# Patient Record
Sex: FEMALE | Race: WHITE | URBAN - METROPOLITAN AREA
[De-identification: names, ages, dates, MRNs, and addresses within clinical notes are randomized per-mention and may not be internally consistent; named-entity substitution may affect disease eponyms.]

---

## 2019-02-21 ENCOUNTER — APPOINTMENT (OUTPATIENT)
Dept: URGENT CARE | Age: 59
End: 2019-02-21

## 2019-02-21 PROCEDURE — 87086 URINE CULTURE/COLONY COUNT: CPT | Performed by: EMERGENCY MEDICINE

## 2024-05-21 ENCOUNTER — APPOINTMENT (RX ONLY)
Dept: URBAN - METROPOLITAN AREA CLINIC 147 | Facility: CLINIC | Age: 64
Setting detail: DERMATOLOGY
End: 2024-05-21

## 2024-05-21 DIAGNOSIS — Z41.9 ENCOUNTER FOR PROCEDURE FOR PURPOSES OTHER THAN REMEDYING HEALTH STATE, UNSPECIFIED: ICD-10-CM

## 2024-05-21 PROCEDURE — ? PLASMA PEN

## 2024-05-21 ASSESSMENT — LOCATION ZONE DERM: LOCATION ZONE: FACE

## 2024-05-21 ASSESSMENT — LOCATION DETAILED DESCRIPTION DERM: LOCATION DETAILED: LEFT INFERIOR CENTRAL MALAR CHEEK

## 2024-05-21 ASSESSMENT — LOCATION SIMPLE DESCRIPTION DERM: LOCATION SIMPLE: LEFT CHEEK

## 2024-05-21 NOTE — PROCEDURE: PLASMA PEN
Treatment Number (Optional): 1
[place]: Plasma Pen Classic
Consent: The patient's consent was obtained.  Risks discussed including but not limited to risks of crusting, scabbing, blistering, scarring, darker or lighter pigmentary change, pain, and infection.  The patient understands the procedure is cosmetic in nature and will require out of pocket payment.
Topical Anesthesia Type: BLT gel (benzocaine 20%, lidocaine 10%, tetracaine 10%)
Post-Care Instructions: I reviewed with the patient in detail post-care instructions.  Stressed the importance of sun avoidance and sun protection.  Patient is to avoid picking at any of the treated lesions. Pt may apply Vaseline to crusted or scabbing areas
Location #1: lower face
Price (Use Numbers Only, No Special Characters Or $): 2000
Detail Level: Zone

## 2024-05-23 ENCOUNTER — APPOINTMENT (RX ONLY)
Dept: URBAN - METROPOLITAN AREA CLINIC 147 | Facility: CLINIC | Age: 64
Setting detail: DERMATOLOGY
End: 2024-05-23

## 2024-05-23 DIAGNOSIS — Z41.9 ENCOUNTER FOR PROCEDURE FOR PURPOSES OTHER THAN REMEDYING HEALTH STATE, UNSPECIFIED: ICD-10-CM

## 2024-05-23 PROCEDURE — ? PLASMA PEN

## 2024-05-23 ASSESSMENT — LOCATION SIMPLE DESCRIPTION DERM: LOCATION SIMPLE: RIGHT FOREHEAD

## 2024-05-23 ASSESSMENT — LOCATION DETAILED DESCRIPTION DERM: LOCATION DETAILED: RIGHT FOREHEAD

## 2024-05-23 ASSESSMENT — LOCATION ZONE DERM: LOCATION ZONE: FACE

## 2024-05-23 NOTE — PROCEDURE: PLASMA PEN
Detail Level: Zone
[place]: Plasma Pen Classic
Consent: The patient's consent was obtained.  Risks discussed including but not limited to risks of crusting, scabbing, blistering, scarring, darker or lighter pigmentary change, pain, and infection.  The patient understands the procedure is cosmetic in nature and will require out of pocket payment.
Length Of Topical Anesthesia Application (Optional): 40 minutes
Price (Use Numbers Only, No Special Characters Or $): 1000
Topical Anesthesia Type: BLT ointment (benzocaine 20%, lidocaine 12%, tetracaine 6%)
Treatment Number (Optional): 0
Post-Care Instructions: I reviewed with the patient in detail post-care instructions.  Stressed the importance of sun avoidance and sun protection.  Patient is to avoid picking at any of the treated lesions. \\nPatient is to avoid picking at any of the treated lesions that may lead to post inflammatory hyperpigmentation and/or scarring. Pt will apply provided subnovii cream and powder to crusted or scabbing areas until all scabs have naturally exfoliated away 2xs BID. Patient acknowledges and understands that the treatment area must be cleansed one time daily. No anti inflammatory medication may be taken for 48 hours. Patient acknowledges and understands that the treatment area must remain as dry as possible for the duration of the scabs and may use a hairdryer on a no heat setting if scabs become too moist. Patient acknowledges and understands that the treatment area will be pink once scabs have fallen away up to several weeks post procedure. 7-10 days Post procedure products once scabs have fallen away were discussed and printed post care instructions were given to patient.
Comments:: Lianet orbital and forehead